# Patient Record
Sex: MALE | Race: WHITE
[De-identification: names, ages, dates, MRNs, and addresses within clinical notes are randomized per-mention and may not be internally consistent; named-entity substitution may affect disease eponyms.]

---

## 2021-07-10 ENCOUNTER — TRANSCRIPTION ENCOUNTER (OUTPATIENT)
Age: 41
End: 2021-07-10

## 2021-09-22 ENCOUNTER — TRANSCRIPTION ENCOUNTER (OUTPATIENT)
Age: 41
End: 2021-09-22

## 2021-12-03 ENCOUNTER — TRANSCRIPTION ENCOUNTER (OUTPATIENT)
Age: 41
End: 2021-12-03

## 2021-12-29 ENCOUNTER — EMERGENCY (EMERGENCY)
Facility: HOSPITAL | Age: 41
LOS: 0 days | Discharge: HOME | End: 2021-12-29
Attending: EMERGENCY MEDICINE
Payer: COMMERCIAL

## 2021-12-29 VITALS
RESPIRATION RATE: 18 BRPM | HEART RATE: 86 BPM | SYSTOLIC BLOOD PRESSURE: 115 MMHG | TEMPERATURE: 99 F | DIASTOLIC BLOOD PRESSURE: 90 MMHG | OXYGEN SATURATION: 95 %

## 2021-12-29 VITALS — WEIGHT: 195.11 LBS | HEIGHT: 72 IN

## 2021-12-29 DIAGNOSIS — K61.1 RECTAL ABSCESS: ICD-10-CM

## 2021-12-29 DIAGNOSIS — R19.7 DIARRHEA, UNSPECIFIED: ICD-10-CM

## 2021-12-29 LAB
ALBUMIN SERPL ELPH-MCNC: 3.3 G/DL — SIGNIFICANT CHANGE UP (ref 3.3–5)
ALP SERPL-CCNC: 76 U/L — SIGNIFICANT CHANGE UP (ref 40–120)
ALT FLD-CCNC: 22 U/L — SIGNIFICANT CHANGE UP (ref 12–78)
ANION GAP SERPL CALC-SCNC: 3 MMOL/L — LOW (ref 5–17)
AST SERPL-CCNC: 15 U/L — SIGNIFICANT CHANGE UP (ref 15–37)
BASOPHILS # BLD AUTO: 0.04 K/UL — SIGNIFICANT CHANGE UP (ref 0–0.2)
BASOPHILS NFR BLD AUTO: 0.3 % — SIGNIFICANT CHANGE UP (ref 0–2)
BILIRUB SERPL-MCNC: 0.5 MG/DL — SIGNIFICANT CHANGE UP (ref 0.2–1.2)
BUN SERPL-MCNC: 13 MG/DL — SIGNIFICANT CHANGE UP (ref 7–23)
CALCIUM SERPL-MCNC: 9.4 MG/DL — SIGNIFICANT CHANGE UP (ref 8.5–10.1)
CHLORIDE SERPL-SCNC: 99 MMOL/L — SIGNIFICANT CHANGE UP (ref 96–108)
CO2 SERPL-SCNC: 31 MMOL/L — SIGNIFICANT CHANGE UP (ref 22–31)
CREAT SERPL-MCNC: 0.84 MG/DL — SIGNIFICANT CHANGE UP (ref 0.5–1.3)
EOSINOPHIL # BLD AUTO: 0.05 K/UL — SIGNIFICANT CHANGE UP (ref 0–0.5)
EOSINOPHIL NFR BLD AUTO: 0.3 % — SIGNIFICANT CHANGE UP (ref 0–6)
GLUCOSE SERPL-MCNC: 104 MG/DL — HIGH (ref 70–99)
HCT VFR BLD CALC: 42.6 % — SIGNIFICANT CHANGE UP (ref 39–50)
HGB BLD-MCNC: 14 G/DL — SIGNIFICANT CHANGE UP (ref 13–17)
IMM GRANULOCYTES NFR BLD AUTO: 0.3 % — SIGNIFICANT CHANGE UP (ref 0–1.5)
LACTATE SERPL-SCNC: 0.7 MMOL/L — SIGNIFICANT CHANGE UP (ref 0.7–2)
LYMPHOCYTES # BLD AUTO: 1.93 K/UL — SIGNIFICANT CHANGE UP (ref 1–3.3)
LYMPHOCYTES # BLD AUTO: 13 % — SIGNIFICANT CHANGE UP (ref 13–44)
MCHC RBC-ENTMCNC: 31.2 PG — SIGNIFICANT CHANGE UP (ref 27–34)
MCHC RBC-ENTMCNC: 32.9 GM/DL — SIGNIFICANT CHANGE UP (ref 32–36)
MCV RBC AUTO: 94.9 FL — SIGNIFICANT CHANGE UP (ref 80–100)
MONOCYTES # BLD AUTO: 1.17 K/UL — HIGH (ref 0–0.9)
MONOCYTES NFR BLD AUTO: 7.9 % — SIGNIFICANT CHANGE UP (ref 2–14)
NEUTROPHILS # BLD AUTO: 11.57 K/UL — HIGH (ref 1.8–7.4)
NEUTROPHILS NFR BLD AUTO: 78.2 % — HIGH (ref 43–77)
PLATELET # BLD AUTO: 294 K/UL — SIGNIFICANT CHANGE UP (ref 150–400)
POTASSIUM SERPL-MCNC: 3.7 MMOL/L — SIGNIFICANT CHANGE UP (ref 3.5–5.3)
POTASSIUM SERPL-SCNC: 3.7 MMOL/L — SIGNIFICANT CHANGE UP (ref 3.5–5.3)
PROT SERPL-MCNC: 8 GM/DL — SIGNIFICANT CHANGE UP (ref 6–8.3)
RBC # BLD: 4.49 M/UL — SIGNIFICANT CHANGE UP (ref 4.2–5.8)
RBC # FLD: 11.8 % — SIGNIFICANT CHANGE UP (ref 10.3–14.5)
SODIUM SERPL-SCNC: 133 MMOL/L — LOW (ref 135–145)
WBC # BLD: 14.8 K/UL — HIGH (ref 3.8–10.5)
WBC # FLD AUTO: 14.8 K/UL — HIGH (ref 3.8–10.5)

## 2021-12-29 PROCEDURE — 83605 ASSAY OF LACTIC ACID: CPT

## 2021-12-29 PROCEDURE — 99285 EMERGENCY DEPT VISIT HI MDM: CPT

## 2021-12-29 PROCEDURE — 36415 COLL VENOUS BLD VENIPUNCTURE: CPT

## 2021-12-29 PROCEDURE — 85025 COMPLETE CBC W/AUTO DIFF WBC: CPT

## 2021-12-29 PROCEDURE — 96374 THER/PROPH/DIAG INJ IV PUSH: CPT

## 2021-12-29 PROCEDURE — 99284 EMERGENCY DEPT VISIT MOD MDM: CPT | Mod: 25

## 2021-12-29 PROCEDURE — 74177 CT ABD & PELVIS W/CONTRAST: CPT | Mod: MA

## 2021-12-29 PROCEDURE — 80053 COMPREHEN METABOLIC PANEL: CPT

## 2021-12-29 PROCEDURE — 74177 CT ABD & PELVIS W/CONTRAST: CPT | Mod: 26,MA

## 2021-12-29 PROCEDURE — 96367 TX/PROPH/DG ADDL SEQ IV INF: CPT | Mod: XU

## 2021-12-29 RX ORDER — ONDANSETRON 8 MG/1
8 TABLET, FILM COATED ORAL ONCE
Refills: 0 | Status: COMPLETED | OUTPATIENT
Start: 2021-12-29 | End: 2021-12-29

## 2021-12-29 RX ORDER — MORPHINE SULFATE 50 MG/1
4 CAPSULE, EXTENDED RELEASE ORAL ONCE
Refills: 0 | Status: DISCONTINUED | OUTPATIENT
Start: 2021-12-29 | End: 2021-12-29

## 2021-12-29 RX ORDER — SODIUM CHLORIDE 9 MG/ML
1000 INJECTION INTRAMUSCULAR; INTRAVENOUS; SUBCUTANEOUS ONCE
Refills: 0 | Status: COMPLETED | OUTPATIENT
Start: 2021-12-29 | End: 2021-12-29

## 2021-12-29 RX ADMIN — ONDANSETRON 8 MILLIGRAM(S): 8 TABLET, FILM COATED ORAL at 20:20

## 2021-12-29 RX ADMIN — MORPHINE SULFATE 4 MILLIGRAM(S): 50 CAPSULE, EXTENDED RELEASE ORAL at 20:20

## 2021-12-29 RX ADMIN — Medication 1 TABLET(S): at 23:32

## 2021-12-29 RX ADMIN — SODIUM CHLORIDE 1000 MILLILITER(S): 9 INJECTION INTRAMUSCULAR; INTRAVENOUS; SUBCUTANEOUS at 20:44

## 2021-12-29 NOTE — ED STATDOCS - NS ED ROS FT
Review of Systems:  	•	CONSTITUTIONAL: no fever, +sweating  	•	SKIN: no rash  	•	RESPIRATORY: no shortness of breath  	•	CARDIAC: no chest pain  	•	GI:  no abd pain, no nausea, no vomiting,  +diarrhea  	•	GENITO-URINARY:  no dysuria  	•	MUSCULOSKELETAL:  no back pain  	•	NEUROLOGIC: no weakness  	•	ALLERGY: no rhinorrhea  	•	PSYSCHIATRIC: appropriate concern about symptoms

## 2021-12-29 NOTE — ED STATDOCS - NSFOLLOWUPINSTRUCTIONS_ED_ALL_ED_FT
Please follow up with colorectal surgeon within one week. May take Ibuprofen/tylenol every 6 hours as needed for pain. Return to ED immediately for any new or concerning symptoms or worsening symptoms    Anorectal Abscess      An abscess is an infected area that contains a collection of pus. An anorectal abscess is an abscess that is near the opening of the anus or around the rectum. Without treatment, an anorectal abscess can become larger and cause other problems, such as a more serious body-wide infection or pain, especially during bowel movements.      What are the causes?  This condition is caused by plugged glands or an infection in one of these areas:  •The anus.      •The area between the anus and the scrotum in males or between the anus and the vagina in females (perineum).        What increases the risk?  The following factors may make you more likely to develop this condition:  •Diabetes or inflammatory bowel disease.      •Having a body defense system (immune system) that is weak.      •Engaging in anal sex.      •Having a sexually transmitted infection (STI).      •Certain kinds of cancer, such as rectal carcinoma, leukemia, or lymphoma.        What are the signs or symptoms?  The main symptom of this condition is pain. The pain may be a throbbing pain that gets worse during bowel movements. Other symptoms include:  •Swelling and redness in the area of the abscess. The redness may go beyond the abscess and appear as a red streak on the skin.      •A visible, painful lump, or a lump that can be felt when touched.      •Bleeding or pus-like discharge from the area.      •Fever.      •General weakness.      •Constipation.      •Diarrhea.        How is this diagnosed?  This condition is diagnosed based on your medical history and a physical exam of the affected area.  •This may involve examining the rectal area with a gloved hand (digital rectal exam).      •Sometimes, the health care provider needs to look into the rectum using a probe, scope, or imaging test.      •For women, it may require a careful vaginal exam.        How is this treated?  Treatment for this condition may include:  •Incision and drainage surgery. This involves making an incision over the abscess to drain the pus.      •Medicines, including antibiotic medicine, pain medicine, stool softeners, or laxatives.        Follow these instructions at home:    Medicines     •Take over-the-counter and prescription medicines only as told by your health care provider.      •If you were prescribed an antibiotic medicine, use it as told by your health care provider. Do not stop using the antibiotic even if you start to feel better.      • Do not drive or use heavy machinery while taking prescription pain medicine.        Wound care      •If gauze was used in the abscess, follow instructions from your health care provider about removing or changing the gauze. It can usually be removed in 2–3 days.      •Wash your hands with soap and water before you remove or change your gauze. If soap and water are not available, use hand .      •If one or more drains were placed in the abscess cavity, be careful not to pull at them. Your health care provider will tell you how long they need to remain in place.    •Check your incision area every day for signs of infection. Check for:  •More redness, swelling, or pain.      •More fluid or blood.      •Warmth.      •Pus or a bad smell.          Managing pain, stiffness, and swelling      •Take a sitz bath 3–4 times a day and after bowel movements. This will help reduce pain and swelling.    •To relieve pain, try sitting:  •On a heating pad with the setting on low.      •On an inflatable donut-shaped cushion.      •If directed, put ice on the affected area:  •Put ice in a plastic bag.      •Place a towel between your skin and the bag.      •Leave the ice on for 20 minutes, 2–3 times a day.        General instructions     •Follow any diet instructions given by your health care provider.      •Keep all follow-up visits as told by your health care provider. This is important.        Contact a health care provider if you have:    •Bleeding from your incision.      •Pain, swelling, or redness that does not improve or gets worse.      •Trouble passing stool or urine.      •Symptoms that return after treatment.        Get help right away if you:    •Have problems moving or using your legs.      •Have severe or increasing pain.      •Have swelling in the affected area that suddenly gets worse.      •Have a large increase in bleeding or passing of pus.      •Develop chills or a fever.        Summary    •An anorectal abscess is an abscess that is near the opening of the anus or around the rectum. An abscess is an infected area that contains a collection of pus.      •The main symptom of this condition is pain. It may be a throbbing pain that gets worse during bowel movements.      •Treatment for an anorectal abscess may include surgery to drain the pus from the abscess. Medicines and sitz baths may also be a part of your treatment plan.      This information is not intended to replace advice given to you by your health care provider. Make sure you discuss any questions you have with your health care provider.

## 2021-12-29 NOTE — ED STATDOCS - PROGRESS NOTE DETAILS
pt aware of ct results, surgical resident at bedside consulting. -Dolores Sheehan PA-C  signed out to night MD/PA to follow. -Dolores Sheehan PA-C I and D performed by surgery, recommends abx, fu with Dr. prabhakar within 1 week. -John Childers PA-C

## 2021-12-29 NOTE — ED STATDOCS - CLINICAL SUMMARY MEDICAL DECISION MAKING FREE TEXT BOX
40 y/o M presents to the ED with rectal pain, swelling and redness to perirectal region at 5 o clock position with ttp, will rule our perirectal abscess, labs, CT, reassess 40 y/o M presents to the ED with rectal pain, swelling and redness to perirectal region at 5 o clock position with ttp, will rule out perirectal abscess, labs, CT, reassess

## 2021-12-29 NOTE — ED STATDOCS - PATIENT PORTAL LINK FT
You can access the FollowMyHealth Patient Portal offered by [facility] by registering at the following website: [email or web address]. By joining [facility]’s FollowMyHealth portal, you will also be able to view your health information using other applications (apps) compatible with our system.

## 2021-12-29 NOTE — ED STATDOCS - CARE PROVIDER_API CALL
Brigid Boyce)  ColonRectal Surgery; Surgery  [address] [place] [address]  Phone: [phone]  Fax: [phone]  Follow Up Time:

## 2021-12-29 NOTE — ED STATDOCS - OBJECTIVE STATEMENT
42 y/o M with no PMHx presents to the ED c/o diarrhea x Saturday. Pt was working out on Saturday with pressure on abd and buttocks. Pt went to his PMD and received cream for hemorrhoids on Monday. Pt spoke with a nurse who told pt to exam his rectum. Pt is sweating every night. Pt has been taking Motrin with minimal relief. Former smoker, occasional drinker.

## 2021-12-29 NOTE — ED STATDOCS - NS_ ATTENDINGSCRIBEDETAILS _ED_A_ED_FT
I, Kartik Chiang MD,  performed the initial face to face bedside interview with this patient regarding history of present illness, review of symptoms and relevant past medical, social and family history.  I completed an independent physical examination.  I was the initial provider who evaluated this patient.  The history, relevant review of systems, past medical and surgical history, medical decision making, and physical examination was documented by the scribe in my presence and I attest to the accuracy of the documentation.

## 2021-12-29 NOTE — ED STATDOCS - PHYSICAL EXAMINATION
Constitutional: NAD AAOx3  Eyes: PERRLA EOMI  Head: Normocephalic atraumatic  Mouth: MMM  Cardiac: regular rate   Resp: Lungs CTAB  GI: Abd s/nt/nd  Neuro: CN2-12 intact  Skin: +swelling and redness to perirectal region at 5 o clock position with ttp

## 2021-12-29 NOTE — ED ADULT NURSE NOTE - NSIMPLEMENTINTERV_GEN_ALL_ED
Implemented All Universal Safety Interventions:  [place] to call system. Call bell, personal items and telephone within reach. Instruct patient to call for assistance. Room bathroom lighting operational. Non-slip footwear when patient is off stretcher. Physically safe environment: no spills, clutter or unnecessary equipment. Stretcher in lowest position, wheels locked, appropriate side rails in place.

## 2021-12-29 NOTE — ED ADULT NURSE NOTE - CHIEF COMPLAINT
Recovering from Addiction    Recovery means making a new life for yourself. This includes finding new interests. It involves building new relationships. It means taking better care of yourself. These will all help you replace substance use with a new and healthier life. They will also help you avoid the things that could make you want to use again.  Make lifestyle changes  A big part of recovery is changing habits. These are habits that may have led to your substance abuse. It s also a time for personal growth. Below are some changes you may want to make.    Find new activities and goals. You may want to try new hobbies and interests. Or, you may want to join an activity group to meet new people.    Build relationships. You may choose to spend more time with loved ones you lost touch with while you were using. You may also want to make new friends. And there may be some friends or family members you will not want to see because they are still using.    Exercise and eat well. Get some physical activity on most days. This can help you feel better. Eat healthy meals with lots of fruits, vegetables, and whole grains. This can also help your well-being. A nutritionist or fitness expert can help you.    Maintain ties with medical and addiction professionals. While you may not need intense professional support, it is important to have reliable safety nets so you can access professional assistance when needed.    Relax and get enough sleep. Good sleep can help you feel better. So can less stress. Ask your counselor about relaxation exercises. These may include meditation. Also ask about stress management classes.  Date Last Reviewed: 2/1/2017 2000-2017 [employer]. [address] [place] [address], [place] [address]. All rights reserved. This information is not intended as a substitute for professional medical care. Always follow your healthcare professional's instructions.        
The patient is a 41y Male complaining of

## 2021-12-30 NOTE — CONSULT NOTE ADULT - ASSESSMENT
A 41 year old male with perianal abscess  Bedside incision and drainage under local anesthesia was done with drainage of round 10 cc 5 cc of pus.   Packing with dressing applied    Plan:  Daily dressing changes  Pain medications  Oral antibiotics  Follow up in Office with Dr Brigid Boyce    Plan discussed with Dr Boyce

## 2021-12-30 NOTE — CONSULT NOTE ADULT - SUBJECTIVE AND OBJECTIVE BOX
A 41 year old male who was seen in the ED for perianal pain for the last week. Patient stated that he had a bout of diarrhea earlier last week and then he started to have pain and swelling with tenderness over time. Pain increases with movement and decreases by lying still. He admits to sensation of hotness but no fever. He denies any nausea, vomiting, abdominal pain, history of similar attacks or urinary symptoms. Patient stated that he tried over the counter hemorrhoid preparations but this did not help.    PMH: Neg  PSH: Neg  Medications: None  Allergies: NKDA    O.E:  Vital Signs Last 24 Hrs  T(C): 37.2 (29 Dec 2021 23:22), Max: 37.2 (29 Dec 2021 23:22)  T(F): 98.9 (29 Dec 2021 23:22), Max: 98.9 (29 Dec 2021 23:22)  HR: 86 (29 Dec 2021 23:22) (82 - 86)  BP: 115/90 (29 Dec 2021 23:22) (115/90 - 138/63)  BP(mean): 80 (29 Dec 2021 23:22) (80 - 80)  RR: 18 (29 Dec 2021 23:22) (18 - 18)  SpO2: 95% (29 Dec 2021 23:22) (92% - 95%)  Afebrile, stable V/S  Alert, conscious, oriented  In pain, no repsiratory distress  Chest clear, equal air entry bilaterally  Abdomen soft and lax, no tenderness  Rectal: Left perianal abscess over the perineal area, extremely tender to touch with area of cellulitis and induration, fluctuant  Extremities No swelling or tenders                          14.0   14.80 )-----------( 294      ( 29 Dec 2021 20:07 )             42.6   12-29    133<L>  |  99  |  13  ----------------------------<  104<H>  3.7   |  31  |  0.84    Ca    9.4      29 Dec 2021 20:07    TPro  8.0  /  Alb  3.3  /  TBili  0.5  /  DBili  x   /  AST  15  /  ALT  22  /  AlkPhos  76  12-29    < from: CT Abdomen and Pelvis w/ IV Cont (12.29.21 @ 20:36) >  Left perianal collection   2.4 x 1.5 cm suggesting abscess.    < end of copied text >

## 2022-01-05 PROBLEM — Z00.00 ENCOUNTER FOR PREVENTIVE HEALTH EXAMINATION: Status: ACTIVE | Noted: 2022-01-05

## 2022-01-06 ENCOUNTER — APPOINTMENT (OUTPATIENT)
Dept: COLORECTAL SURGERY | Facility: CLINIC | Age: 42
End: 2022-01-06
Payer: COMMERCIAL

## 2022-01-06 VITALS
RESPIRATION RATE: 15 BRPM | OXYGEN SATURATION: 98 % | TEMPERATURE: 98 F | SYSTOLIC BLOOD PRESSURE: 118 MMHG | WEIGHT: 190 LBS | DIASTOLIC BLOOD PRESSURE: 72 MMHG | HEIGHT: 72 IN | BODY MASS INDEX: 25.73 KG/M2 | HEART RATE: 15 BPM

## 2022-01-06 DIAGNOSIS — K61.0 ANAL ABSCESS: ICD-10-CM

## 2022-01-06 PROCEDURE — 46600 DIAGNOSTIC ANOSCOPY SPX: CPT

## 2022-01-06 PROCEDURE — 99202 OFFICE O/P NEW SF 15 MIN: CPT | Mod: 25

## 2022-01-06 NOTE — PHYSICAL EXAM
[Respiratory Effort] : normal respiratory effort [Normal Rate and Rhythm] : normal rate and rhythm [Alert] : alert [Oriented to Person] : oriented to person [Oriented to Place] : oriented to place [Oriented to Time] : oriented to time [Calm] : calm [de-identified] : soft, nondistended [de-identified] : External exam with left anterior open wound consistent with prior I&D site.  No erythema or fluctuance, no tenderness.  Digital rectal exam without masses or tenderness.  Anoscopy normal, no internal fistula openings noted. [de-identified] : healing perianal I&D site

## 2022-01-06 NOTE — HISTORY OF PRESENT ILLNESS
[FreeTextEntry1] : 41yoM who underwent bedside I&D of L perianal abscess at [facility] last Wednesday.  He is nearly done with his course of antibiotics.  He denies any fevers/chills/nausea/vomiting.  No issues with bowel movements.  He describes that there is a still a small open wound.\par \par No personal or family history of colon polyps, cancer, or IBD.

## 2022-01-06 NOTE — REVIEW OF SYSTEMS
[As Noted in HPI] : as noted in HPI [Negative] : Heme/Lymph [FreeTextEntry7] : Recent I&D of perianal abscess

## 2023-03-23 NOTE — CONSULT NOTE ADULT - CONSULT REQUESTED DATE/TIME
29-Dec-2021 10:30 Oral Minoxidil Pregnancy And Lactation Text: This medication should only be used when clearly needed if you are pregnant, attempting to become pregnant or breast feeding.

## 2025-01-01 ENCOUNTER — NON-APPOINTMENT (OUTPATIENT)
Age: 45
End: 2025-01-01